# Patient Record
Sex: MALE | Race: WHITE
[De-identification: names, ages, dates, MRNs, and addresses within clinical notes are randomized per-mention and may not be internally consistent; named-entity substitution may affect disease eponyms.]

---

## 2019-03-11 ENCOUNTER — HOSPITAL ENCOUNTER (EMERGENCY)
Dept: HOSPITAL 80 - FED | Age: 49
Discharge: HOME | End: 2019-03-11
Payer: COMMERCIAL

## 2019-03-11 VITALS — DIASTOLIC BLOOD PRESSURE: 80 MMHG | SYSTOLIC BLOOD PRESSURE: 140 MMHG

## 2019-03-11 DIAGNOSIS — J40: Primary | ICD-10-CM

## 2019-03-11 LAB — PLATELET # BLD: 344 10^3/UL (ref 150–400)

## 2019-03-11 NOTE — ASMTCMCOM
CM Note

 

CM Note                       

Notes:

Pt insurance information updated.  Appointment made at [facility], Dr. Moss. Wednesday, March 13, with check in time of 2:40. CM  available as needed. 

 

Date Signed:  03/11/2019 01:40 PM

Electronically Signed By:Jayce Barrientos LCSW

## 2019-03-11 NOTE — CPEKG
Test Reason : OPEN

Blood Pressure : ***/*** mmHG

Vent. Rate : 074 BPM     Atrial Rate : 074 BPM

   P-R Int : 150 ms          QRS Dur : 103 ms

    QT Int : 391 ms       P-R-T Axes : 053 053 008 degrees

   QTc Int : 434 ms

 

Sinus rhythm

 

Confirmed by McCollester, Laughlin ([id number]) on 3/11/2019 3:30:43 PM

 

Referred By: Laughlin McCollester           Confirmed By:Laughlin McCollester

## 2019-03-11 NOTE — EDPHY
H & P


Time Seen by Provider: 03/11/19 10:52


HPI/ROS: 





HPI


Shortness of breath, cough.





48-year-old male by private vehicle.  This patient reports he had an influenza 

like syndrome developed about 3 weeks ago.  This included malaise, muscle aches 

and joint aches and cough.  He reports that he got over this.  He reports 

however that he has been traveling a lot by airplane to [facility].  

He reports that he has had a mild residual cough but more feeling short of 

breath.  He denies any associated chest pain.  He has not had a fever.





ROS:





Constitutional:  No fever, no chills.  No weakness.


Eyes:  No discharge.  No changes in vision.


ENT:  No sore throat.  No nasal congestion or rhinorrhea.


Respiratory:  As above.


Cardiac:  No chest pain, no palpitations.


Gastrointestinal:  No abdominal pain, no vomiting, no diarrhea.


Genitourinary:  No hematuria.  No dysuria or increased frequency with urination.


Musculoskeletal:  No back pain.  No neck pain.  No myalgias or arthralgias.


Skin:  No rashes.


Neurological:  No headache.  No focal weakness or altered sensation.





Past medical history:  Denies.





Social history:  Nonsmoker.  Here by himself.  No alcohol.  As above.





Physical Exam:





General Appearance:  Alert, no distress.  This patient is responding to 

questions appropriately and in full sentences.  This patient appears well-

hydrated and well-nourished.


Eyes:  Pupils equal and round no pallor or injection.  No lid edema, erythema 

or injection.


Respiratory:  There are no retractions, lungs are clear to auscultation with 

good air movement bilaterally.  No tachypnea.


Cardiovascular:  Regular rate and rhythm.  No murmur.


Neurological:  Motor sensory function is grossly intact.  Cranial nerves are 

normal.  Gait is normal.


Skin:  Warm and dry, no rashes.


Musculoskeletal:  Neck is supple and nontender.


Extremities are symmetrical.  All joints range without pain or impingement.


Psychiatric:  No agitation.  No depression.





Database:





EKG:





EKG time is 12:13 p.m.; EKG shows a narrow complex normal sinus rhythm with a 

ventricular rate of 74.  The KS, QRS, QT intervals are within normal limits.  

There are no ST-T wave changes indicative of ischemic or injury pattern.  No 

evidence of right heart strain.  Interpreted by me.





Imaging:





Chest x-ray PA and lateral; cardiac mediastinal silhouette is unremarkable.  No 

evidence of infiltrate or pneumothorax.  Mild bronchitis.  No other acute 

cardiopulmonary disease process noted.





Procedures:





Emergency department course:





Triage vital signs reviewed.  He is moderately hypertensive.  Vital signs are 

otherwise normal.  The patient's presentation is likely secondary to a residual 

bronchitis after an initial influenza like illness.  However his history of 

traveling is concerning and his complaint of shortness of breath is concerning.

  Cardiac workup including D-dimer testing ordered.  The patient endorses.





12:20 p.m., the patient was re-evaluated, results of his emergency department 

workup discussed with him.  These results are reassuring.  Cardiac etiology, 

thrombo embolic disease are unlikely.  The patient does feel comfortable going 

home.  I discussed management of bronchitis with him.  I will prescribe him an 

albuterol inhaler.  I do not feel he requires antibiotics at this time.  Follow-

up and return to emergency department precautions reviewed with him.  He feels 

comfortable going home.  All of his questions were answered.  He was discharged 

from the emergency department in good condition.





Differential Diagnosis:





The differential diagnosis on this patient includes but is not limited to 

bronchitis.  Pulmonary embolism, acute coronary syndrome, congestive heart 

failure, pneumonia, pneumothorax unlikely.  This represents a partial list of 

diagnoses considered.  These considerations are based on history, physical exam

, past history, reassessment and diagnostic testing.


Smoking Status: Never smoked


Constitutional: 


 Initial Vital Signs











Temperature (C)  36.8 C   03/11/19 10:44


 


Heart Rate  74   03/11/19 10:44


 


Respiratory Rate  16   03/11/19 10:44


 


Blood Pressure  153/92 H  03/11/19 10:44


 


O2 Sat (%)  96   03/11/19 10:44








 











O2 Delivery Mode               Room Air














Allergies/Adverse Reactions: 


 





No Known Allergies Allergy (Unverified 03/11/19 10:44)


 








Home Medications: 














 Medication  Instructions  Recorded


 


Advil  03/11/19


 


Albuterol [Proventil Inhaler HFA 2 puffs IH Q2-4PRN PRN #1 mdi 03/11/19





(*)]  














Medical Decision Making





- Diagnostics


Imaging Results: 


 Imaging Impressions





Chest X-Ray  03/11/19 10:54


Impression: Airways disease. No pneumonia.


 














- Data Points


Laboratory Results: 


 Laboratory Results





 03/11/19 11:41 





 03/11/19 11:41 





 











  03/11/19 03/11/19 03/11/19





  11:44 11:41 11:41


 


WBC      





    


 


RBC      





    


 


Hgb      





    


 


Hct      





    


 


MCV      





    


 


MCH      





    


 


MCHC      





    


 


RDW      





    


 


Plt Count      





    


 


MPV      





    


 


Neut % (Auto)      





    


 


Lymph % (Auto)      





    


 


Mono % (Auto)      





    


 


Eos % (Auto)      





    


 


Baso % (Auto)      





    


 


Nucleat RBC Rel Count      





    


 


Absolute Neuts (auto)      





    


 


Absolute Lymphs (auto)      





    


 


Absolute Monos (auto)      





    


 


Absolute Eos (auto)      





    


 


Absolute Basos (auto)      





    


 


Absolute Nucleated RBC      





    


 


Immature Gran %      





    


 


Immature Gran #      





    


 


D-Dimer    < 0.27 ug/mLFEU ug/mLFEU  





    (0.00-0.50)  


 


Sodium      136 mEq/L mEq/L





     (135-145) 


 


Potassium      4.6 mEq/L mEq/L





     (3.5-5.2) 


 


Chloride      105 mEq/L mEq/L





     () 


 


Carbon Dioxide      22 mEq/l mEq/l





     (22-31) 


 


Anion Gap      9 mEq/L mEq/L





     (6-14) 


 


BUN      13 mg/dL mg/dL





     (7-23) 


 


Creatinine      0.8 mg/dL mg/dL





     (0.7-1.3) 


 


Estimated GFR      > 60 





    


 


Glucose      100 mg/dL mg/dL





     () 


 


Calcium      9.7 mg/dL mg/dL





     (8.5-10.4) 


 


POC Troponin I  0.01 ng/mL ng/mL    





   (0.00-0.08)   














  03/11/19





  11:41


 


WBC  10.64 10^3/uL H 10^3/uL





   (3.80-9.50) 


 


RBC  4.73 10^6/uL 10^6/uL





   (4.40-6.38) 


 


Hgb  14.8 g/dL g/dL





   (13.7-17.5) 


 


Hct  43.7 % %





   (40.0-51.0) 


 


MCV  92.4 fL fL





   (81.5-99.8) 


 


MCH  31.3 pg pg





   (27.9-34.1) 


 


MCHC  33.9 g/dL g/dL





   (32.4-36.7) 


 


RDW  11.9 % %





   (11.5-15.2) 


 


Plt Count  344 10^3/uL 10^3/uL





   (150-400) 


 


MPV  10.1 fL fL





   (8.7-11.7) 


 


Neut % (Auto)  76.7 % H %





   (39.3-74.2) 


 


Lymph % (Auto)  14.8 % L %





   (15.0-45.0) 


 


Mono % (Auto)  4.9 % %





   (4.5-13.0) 


 


Eos % (Auto)  2.8 % %





   (0.6-7.6) 


 


Baso % (Auto)  0.5 % %





   (0.3-1.7) 


 


Nucleat RBC Rel Count  0.0 % %





   (0.0-0.2) 


 


Absolute Neuts (auto)  8.17 10^3/uL H 10^3/uL





   (1.70-6.50) 


 


Absolute Lymphs (auto)  1.57 10^3/uL 10^3/uL





   (1.00-3.00) 


 


Absolute Monos (auto)  0.52 10^3/uL 10^3/uL





   (0.30-0.80) 


 


Absolute Eos (auto)  0.30 10^3/uL 10^3/uL





   (0.03-0.40) 


 


Absolute Basos (auto)  0.05 10^3/uL 10^3/uL





   (0.02-0.10) 


 


Absolute Nucleated RBC  0.00 10^3/uL 10^3/uL





   (0-0.01) 


 


Immature Gran %  0.3 % %





   (0.0-1.1) 


 


Immature Gran #  0.03 10^3/uL 10^3/uL





   (0.00-0.10) 


 


D-Dimer  





  


 


Sodium  





  


 


Potassium  





  


 


Chloride  





  


 


Carbon Dioxide  





  


 


Anion Gap  





  


 


BUN  





  


 


Creatinine  





  


 


Estimated GFR  





  


 


Glucose  





  


 


Calcium  





  


 


POC Troponin I  





  











Medications Given: 


 








Discontinued Medications





Albuterol/Ipratropium (Duoneb)  3 ml IH EDNOW ONE


   Stop: 03/11/19 11:33


   Last Admin: 03/11/19 11:49 Dose:  3 ml





Point of Care Test Results: 


 Chemistry











  03/11/19





  11:44


 


POC Troponin I  0.01 ng/mL ng/mL





   (0.00-0.08) 














Departure





- Departure


Disposition: Home, Routine, Self-Care


Clinical Impression: 


 Bronchitis





Condition: Good


Instructions:  Acute Bronchitis (ED)


Additional Instructions: 


Read and follow provided instructions.





Follow-up with your primary care physician in 1-2 days for re-evaluation.





Albuterol inhaler:  1-2 puffs every 2-4 hours as needed for cough and sensation 

of shortness of breath.





Return to the emergency department for worsening symptoms, worsening cough, 

high fever, difficulty breathing or other serious concerns.


Referrals: 


NONE *PRIMARY CARE P,. [Primary Care Provider] - As per Instructions


Prescriptions: 


Albuterol [Proventil Inhaler HFA (*)] 2 puffs IH Q2-4PRN PRN #1 mdi


 PRN Reason: Sob/Dyspnea

## 2019-03-11 NOTE — ASMTCMCOM
CM Note

 

CM Note                       

Notes:

Pt. noted with no PCP and would like [facility] referral.  Insurance information in the system is 

incorrect, pt. stated that he has had difficulty with [facility] because it is a different 

division.  Pt provided with [facility]/PCP scheduling numbers and number to CM office if he needs 

additional assistance with scheduling. 

 

Date Signed:  03/11/2019 12:37 PM

Electronically Signed By:Jayce Barrientos LCSW